# Patient Record
Sex: FEMALE | URBAN - METROPOLITAN AREA
[De-identification: names, ages, dates, MRNs, and addresses within clinical notes are randomized per-mention and may not be internally consistent; named-entity substitution may affect disease eponyms.]

---

## 2023-06-14 ENCOUNTER — TELEPHONE (OUTPATIENT)
Dept: DERMATOLOGY | Facility: CLINIC | Age: 38
End: 2023-06-14

## 2023-06-14 NOTE — TELEPHONE ENCOUNTER
[facility] Call Center    Phone Message    May a detailed message be left on voicemail: yes     Reason for Call: Other: Patients  is calling to see if Dr Roe or a care team member can reach out to him regarding the patient.   The patient was diagnosed with Alopecia  25 years ago and now they are in need of a note for the insurance company with that diagnosis on it in order for her to get coverage for wigs.   I also gave the  the number for medical records as I didn't see anything in the chart.   Please reach out to discuss.   Thanks     Action Taken: Other: peds Derm    Travel Screening: Not Applicable

## 2023-06-15 NOTE — TELEPHONE ENCOUNTER
Adult pt seeking care from Dr. Roe. Will route to [facility] staff to address

## 2023-06-15 NOTE — TELEPHONE ENCOUNTER
JASEN at patient's 's phone number [phone] requesting he have gonzalez call clinic back. No consent to communicate in chart for . Would need one for clinic to speak to  regarding care or verbal okay from patient for every call.  Patient's number listed in chart says it is not in service when called.     Please have patient's number updated when calling back. No records in chart. If patient has not seen Dr. Roe in past then she would need to be seen prior to letter being written. They can reach out to another doctor that has seen them before for letter if that is the case. If she saw Dr. Roe in past, please get date or year of last office visit so paper medical records can be retrieved.     Choco Alexis, EMT

## 2023-06-16 NOTE — TELEPHONE ENCOUNTER
Closing this encounter. Unable to reach patient at number in chart as it says it is not in service when called and number listed below is reportedly for patient's  but no consent to communicate or any records are in chart for this patient.     LVM at patient's 's phone number [phone] requesting he have gonzalez call clinic back. No consent to communicate in chart for . Would need one for clinic to speak to  regarding care. Patient's number listed in chart says it is not in service when called.    Choco Alexis, EMT